# Patient Record
Sex: MALE | Race: OTHER | HISPANIC OR LATINO | URBAN - METROPOLITAN AREA
[De-identification: names, ages, dates, MRNs, and addresses within clinical notes are randomized per-mention and may not be internally consistent; named-entity substitution may affect disease eponyms.]

---

## 2018-08-21 ENCOUNTER — EMERGENCY (EMERGENCY)
Facility: HOSPITAL | Age: 38
LOS: 1 days | Discharge: HOME | End: 2018-08-21
Attending: EMERGENCY MEDICINE | Admitting: EMERGENCY MEDICINE
Payer: COMMERCIAL

## 2018-08-21 VITALS
RESPIRATION RATE: 18 BRPM | WEIGHT: 177.03 LBS | DIASTOLIC BLOOD PRESSURE: 84 MMHG | TEMPERATURE: 98 F | HEART RATE: 71 BPM | SYSTOLIC BLOOD PRESSURE: 150 MMHG | OXYGEN SATURATION: 98 %

## 2018-08-21 VITALS
SYSTOLIC BLOOD PRESSURE: 130 MMHG | HEART RATE: 74 BPM | OXYGEN SATURATION: 100 % | TEMPERATURE: 98 F | RESPIRATION RATE: 18 BRPM | DIASTOLIC BLOOD PRESSURE: 78 MMHG

## 2018-08-21 DIAGNOSIS — R20.0 ANESTHESIA OF SKIN: ICD-10-CM

## 2018-08-21 LAB
ALBUMIN SERPL ELPH-MCNC: 4.9 G/DL — SIGNIFICANT CHANGE UP (ref 3.3–5)
ALP SERPL-CCNC: <5 U/L — LOW (ref 40–120)
ALT FLD-CCNC: 23 U/L — SIGNIFICANT CHANGE UP (ref 10–45)
ANION GAP SERPL CALC-SCNC: 14 MMOL/L — SIGNIFICANT CHANGE UP (ref 5–17)
AST SERPL-CCNC: 22 U/L — SIGNIFICANT CHANGE UP (ref 10–40)
BASOPHILS NFR BLD AUTO: 0.3 % — SIGNIFICANT CHANGE UP (ref 0–2)
BILIRUB SERPL-MCNC: 1 MG/DL — SIGNIFICANT CHANGE UP (ref 0.2–1.2)
BUN SERPL-MCNC: 15 MG/DL — SIGNIFICANT CHANGE UP (ref 7–23)
CALCIUM SERPL-MCNC: 10.2 MG/DL — SIGNIFICANT CHANGE UP (ref 8.4–10.5)
CHLORIDE SERPL-SCNC: 98 MMOL/L — SIGNIFICANT CHANGE UP (ref 96–108)
CO2 SERPL-SCNC: 26 MMOL/L — SIGNIFICANT CHANGE UP (ref 22–31)
CREAT SERPL-MCNC: 0.88 MG/DL — SIGNIFICANT CHANGE UP (ref 0.5–1.3)
EOSINOPHIL NFR BLD AUTO: 1.4 % — SIGNIFICANT CHANGE UP (ref 0–6)
GLUCOSE SERPL-MCNC: 105 MG/DL — HIGH (ref 70–99)
HCT VFR BLD CALC: 44.3 % — SIGNIFICANT CHANGE UP (ref 39–50)
HGB BLD-MCNC: 15.7 G/DL — SIGNIFICANT CHANGE UP (ref 13–17)
LYMPHOCYTES # BLD AUTO: 42.6 % — SIGNIFICANT CHANGE UP (ref 13–44)
MCHC RBC-ENTMCNC: 30.8 PG — SIGNIFICANT CHANGE UP (ref 27–34)
MCHC RBC-ENTMCNC: 35.4 G/DL — SIGNIFICANT CHANGE UP (ref 32–36)
MCV RBC AUTO: 86.9 FL — SIGNIFICANT CHANGE UP (ref 80–100)
MONOCYTES NFR BLD AUTO: 9.6 % — SIGNIFICANT CHANGE UP (ref 2–14)
NEUTROPHILS NFR BLD AUTO: 46.1 % — SIGNIFICANT CHANGE UP (ref 43–77)
PLATELET # BLD AUTO: 226 K/UL — SIGNIFICANT CHANGE UP (ref 150–400)
POTASSIUM SERPL-MCNC: 4.2 MMOL/L — SIGNIFICANT CHANGE UP (ref 3.5–5.3)
POTASSIUM SERPL-SCNC: 4.2 MMOL/L — SIGNIFICANT CHANGE UP (ref 3.5–5.3)
PROT SERPL-MCNC: 7.6 G/DL — SIGNIFICANT CHANGE UP (ref 6–8.3)
RBC # BLD: 5.1 M/UL — SIGNIFICANT CHANGE UP (ref 4.2–5.8)
RBC # FLD: 12.1 % — SIGNIFICANT CHANGE UP (ref 10.3–16.9)
SODIUM SERPL-SCNC: 138 MMOL/L — SIGNIFICANT CHANGE UP (ref 135–145)
WBC # BLD: 6.4 K/UL — SIGNIFICANT CHANGE UP (ref 3.8–10.5)
WBC # FLD AUTO: 6.4 K/UL — SIGNIFICANT CHANGE UP (ref 3.8–10.5)

## 2018-08-21 PROCEDURE — 99284 EMERGENCY DEPT VISIT MOD MDM: CPT | Mod: 25

## 2018-08-21 PROCEDURE — 70450 CT HEAD/BRAIN W/O DYE: CPT

## 2018-08-21 PROCEDURE — 80053 COMPREHEN METABOLIC PANEL: CPT

## 2018-08-21 PROCEDURE — 85025 COMPLETE CBC W/AUTO DIFF WBC: CPT

## 2018-08-21 PROCEDURE — 36415 COLL VENOUS BLD VENIPUNCTURE: CPT

## 2018-08-21 PROCEDURE — 82962 GLUCOSE BLOOD TEST: CPT

## 2018-08-21 PROCEDURE — 99284 EMERGENCY DEPT VISIT MOD MDM: CPT

## 2018-08-21 PROCEDURE — 70450 CT HEAD/BRAIN W/O DYE: CPT | Mod: 26

## 2018-08-21 NOTE — ED ADULT NURSE REASSESSMENT NOTE - NS ED NURSE REASSESS COMMENT FT1
patient a/oX 3, c/o or right facial numbness, no pain, no facial asymmetry or other neuro deficits.  Vital signs stable.  Left AC PIV #20 in place, all labs sent, no complications.  CT scan pending.  STable and comfortable.

## 2018-08-21 NOTE — ED ADULT NURSE NOTE - OBJECTIVE STATEMENT
Patient c/o of right facial numbness started yesterday, no headache or any other neuro deficits, no facial droop, slurred speech, weakness or unsteady gait.

## 2018-08-21 NOTE — ED PROVIDER NOTE - OBJECTIVE STATEMENT
39 y/o m no pmh presents c/o numbness to right cheek for the past day.  Pt stating has been having intermittent discomfort to right ear for the past 2 years.  Pt denies change in vision, headache, slurred speech, ext weakness/numbness, dizziness, trauma, all other ROS negative.

## 2018-08-21 NOTE — ED PROVIDER NOTE - ATTENDING CONTRIBUTION TO CARE
38M reports subjective numbness on R side on check. Pt has no medical problems, no diabetes, no htn, nl gait, no dizziness, no headache. Subjective decreased sensation on cheek no actual numbness, nl cranial nerves otherwise grossly, strength equal in all 4 ext, sensation intact to light touch f/a/l, gait steady .Nl TM, coordination fnf/hts intact. Plan for CT to r/o acute stroke, will have pt follow up with neurology, no e/o facial droop or facial weakness to suggest Rama.

## 2018-08-21 NOTE — ED ADULT NURSE NOTE - CHPI ED NUR SYMPTOMS NEG
no suicidal/no weight loss/no agitation/no change in level of consciousness/no fever/no hallucinations/no weakness/no disorientation

## 2018-08-21 NOTE — ED ADULT NURSE NOTE - NSIMPLEMENTINTERV_GEN_ALL_ED
Implemented All Universal Safety Interventions:  [place] to call system. Call bell, personal items and telephone within reach. Instruct patient to call for assistance. Room bathroom lighting operational. Non-slip footwear when patient is off stretcher. Physically safe environment: no spills, clutter or unnecessary equipment. Stretcher in lowest position, wheels locked, appropriate side rails in place.

## 2018-08-21 NOTE — ED PROVIDER NOTE - MEDICAL DECISION MAKING DETAILS
39 y/o m reporting numbness to right cheek for one day; no neuro deficits on exam, not consistent with bell's palsy as pt has no cranial nerve palsy.  Sensation is intact to right cheek, although subjectively different from left cheek.  Do no suspect stroke, will get CT head.  If CT negative, pt to f/u with neuro outpatient, labs wnl.

## 2018-08-21 NOTE — ED ADULT NURSE REASSESSMENT NOTE - NS ED NURSE REASSESS COMMENT FT1
Patient brought to cT scan at this time in stable condition.  No new neuro symptoms or complaints.  Vital signs stable.

## 2018-09-08 PROBLEM — Z00.00 ENCOUNTER FOR PREVENTIVE HEALTH EXAMINATION: Status: ACTIVE | Noted: 2018-09-08

## 2018-10-26 ENCOUNTER — APPOINTMENT (OUTPATIENT)
Dept: NEUROLOGY | Facility: CLINIC | Age: 38
End: 2018-10-26
Payer: COMMERCIAL

## 2018-10-26 VITALS
SYSTOLIC BLOOD PRESSURE: 125 MMHG | DIASTOLIC BLOOD PRESSURE: 82 MMHG | HEIGHT: 63 IN | HEART RATE: 87 BPM | OXYGEN SATURATION: 98 % | BODY MASS INDEX: 31.01 KG/M2 | WEIGHT: 175 LBS

## 2018-10-26 DIAGNOSIS — Z78.9 OTHER SPECIFIED HEALTH STATUS: ICD-10-CM

## 2018-10-26 DIAGNOSIS — M54.2 CERVICALGIA: ICD-10-CM

## 2018-10-26 DIAGNOSIS — R20.0 ANESTHESIA OF SKIN: ICD-10-CM

## 2018-10-26 DIAGNOSIS — G89.29 CERVICALGIA: ICD-10-CM

## 2018-10-26 DIAGNOSIS — Z84.1 FAMILY HISTORY OF DISORDERS OF KIDNEY AND URETER: ICD-10-CM

## 2018-10-26 DIAGNOSIS — Z87.39 PERSONAL HISTORY OF OTHER DISEASES OF THE MUSCULOSKELETAL SYSTEM AND CONNECTIVE TISSUE: ICD-10-CM

## 2018-10-26 DIAGNOSIS — R20.2 ANESTHESIA OF SKIN: ICD-10-CM

## 2018-10-26 PROCEDURE — 99243 OFF/OP CNSLTJ NEW/EST LOW 30: CPT

## 2018-10-26 RX ORDER — CYCLOBENZAPRINE HYDROCHLORIDE 5 MG/1
5 TABLET, FILM COATED ORAL
Qty: 30 | Refills: 0 | Status: ACTIVE | COMMUNITY
Start: 2018-10-26 | End: 1900-01-01

## 2018-10-26 RX ORDER — TERBINAFINE HYDROCHLORIDE 250 MG/1
250 TABLET ORAL
Qty: 30 | Refills: 0 | Status: ACTIVE | COMMUNITY
Start: 2018-07-07

## 2018-10-26 RX ORDER — KETOCONAZOLE 20 MG/G
2 CREAM TOPICAL
Qty: 30 | Refills: 0 | Status: ACTIVE | COMMUNITY
Start: 2018-05-12

## 2018-10-26 RX ORDER — OMEPRAZOLE 20 MG/1
20 CAPSULE, DELAYED RELEASE ORAL
Qty: 30 | Refills: 0 | Status: ACTIVE | COMMUNITY
Start: 2018-07-16

## 2018-10-26 RX ORDER — DICLOFENAC SODIUM 75 MG/1
75 TABLET, DELAYED RELEASE ORAL
Qty: 15 | Refills: 0 | Status: ACTIVE | COMMUNITY
Start: 2018-07-16

## 2018-10-26 RX ORDER — NYSTATIN 100000 1/G
100000 POWDER TOPICAL
Qty: 60 | Refills: 0 | Status: ACTIVE | COMMUNITY
Start: 2018-08-04

## 2018-11-09 ENCOUNTER — APPOINTMENT (OUTPATIENT)
Dept: MRI IMAGING | Facility: CLINIC | Age: 38
End: 2018-11-09
Payer: COMMERCIAL

## 2018-11-09 ENCOUNTER — OUTPATIENT (OUTPATIENT)
Dept: OUTPATIENT SERVICES | Facility: HOSPITAL | Age: 38
LOS: 1 days | End: 2018-11-09

## 2018-11-09 PROCEDURE — 72141 MRI NECK SPINE W/O DYE: CPT | Mod: 26

## 2018-11-09 PROCEDURE — 70551 MRI BRAIN STEM W/O DYE: CPT | Mod: 26

## 2018-11-12 ENCOUNTER — RESULT REVIEW (OUTPATIENT)
Age: 38
End: 2018-11-12

## 2018-12-12 ENCOUNTER — APPOINTMENT (OUTPATIENT)
Dept: NEUROLOGY | Facility: CLINIC | Age: 38
End: 2018-12-12

## 2023-04-12 NOTE — ED ADULT NURSE NOTE - PRO INTERPRETER NEED 2
Sent augusto asking if he still wants a sooner appointment.kt  Thank you,    Veronika GILESRN BSN  [facility]- [phone]     [place]
